# Patient Record
Sex: FEMALE | Race: WHITE | Employment: STUDENT | URBAN - METROPOLITAN AREA
[De-identification: names, ages, dates, MRNs, and addresses within clinical notes are randomized per-mention and may not be internally consistent; named-entity substitution may affect disease eponyms.]

---
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
## 2020-03-11 ENCOUNTER — HEALTH MAINTENANCE LETTER (OUTPATIENT)
Age: 20
End: 2020-03-11

## 2020-12-27 ENCOUNTER — HEALTH MAINTENANCE LETTER (OUTPATIENT)
Age: 20
End: 2020-12-27

## 2021-04-20 ENCOUNTER — HOSPITAL ENCOUNTER (EMERGENCY)
Facility: CLINIC | Age: 21
Discharge: HOME | End: 2021-04-21
Attending: EMERGENCY MEDICINE | Admitting: EMERGENCY MEDICINE
Payer: COMMERCIAL

## 2021-04-20 ENCOUNTER — APPOINTMENT (OUTPATIENT)
Dept: GENERAL RADIOLOGY | Facility: CLINIC | Age: 21
End: 2021-04-20
Attending: EMERGENCY MEDICINE
Payer: COMMERCIAL

## 2021-04-20 VITALS
TEMPERATURE: 98.5 F | SYSTOLIC BLOOD PRESSURE: 123 MMHG | OXYGEN SATURATION: 100 % | HEART RATE: 70 BPM | DIASTOLIC BLOOD PRESSURE: 67 MMHG

## 2021-04-20 DIAGNOSIS — S59.901A ELBOW INJURY, RIGHT, INITIAL ENCOUNTER: ICD-10-CM

## 2021-04-20 PROCEDURE — 250N000013 HC RX MED GY IP 250 OP 250 PS 637: Performed by: EMERGENCY MEDICINE

## 2021-04-20 PROCEDURE — 99284 EMERGENCY DEPT VISIT MOD MDM: CPT | Performed by: EMERGENCY MEDICINE

## 2021-04-20 PROCEDURE — 73070 X-RAY EXAM OF ELBOW: CPT | Mod: RT

## 2021-04-20 PROCEDURE — 99283 EMERGENCY DEPT VISIT LOW MDM: CPT

## 2021-04-20 PROCEDURE — 73070 X-RAY EXAM OF ELBOW: CPT | Mod: 26 | Performed by: RADIOLOGY

## 2021-04-20 RX ORDER — OXYCODONE AND ACETAMINOPHEN 5; 325 MG/1; MG/1
1 TABLET ORAL ONCE
Status: COMPLETED | OUTPATIENT
Start: 2021-04-20 | End: 2021-04-20

## 2021-04-20 RX ORDER — OXYCODONE AND ACETAMINOPHEN 5; 325 MG/1; MG/1
1-2 TABLET ORAL EVERY 4 HOURS PRN
Qty: 12 TABLET | Refills: 0 | Status: SHIPPED | OUTPATIENT
Start: 2021-04-20

## 2021-04-20 RX ADMIN — OXYCODONE HYDROCHLORIDE AND ACETAMINOPHEN 1 TABLET: 5; 325 TABLET ORAL at 23:32

## 2021-04-21 NOTE — ED PROVIDER NOTES
ED Provider Note  [facility]      History     Chief Complaint   Patient presents with     Arm Injury     HPI  Zuleika Noonan is a 20 year old female who presents to the ED for evaluation of a right arm injury following a fall. Patient reports that she fell off of a playground platform appproximately at 5 PM. She notes that since the fall, she has not been able to fully move her arm without experiencing pain.  Patient is right-handed.  She denies any head injury, neck or back injury.  Denies any numbness or weakness in the right hand, but does have increased pain when moving the right hand.  Her pain radiates from the right elbow down into the forearm and up into the bicep area.  She otherwise denies fever, cough, chest pain, shortness of breath, abdominal pain or other injuries.    Past Medical History  History reviewed. No pertinent past medical history.  History reviewed. No pertinent surgical history.  oxyCODONE-acetaminophen (PERCOCET) 5-325 MG tablet  NEW MED  polyethylene glycol (MIRALAX/GLYCOLAX) Packet      No Known Allergies  Family History  History reviewed. No pertinent family history.  Social History   Social History     Tobacco Use     Smoking status: Never Smoker     Smokeless tobacco: Never Used   Substance Use Topics     Alcohol use: Yes     Comment: A BEER A WEEK     Drug use: No      Past medical history, past surgical history, medications, allergies, family history, and social history were reviewed with the patient. No additional pertinent items.       Review of Systems  A complete review of systems was performed with pertinent positives and negatives noted in the HPI, and all other systems negative.    Physical Exam   BP: 123/67  Pulse: 70  Temp: 98.5  F (36.9  C)  SpO2: 100 %  Physical Exam    GEN:  Alert, well developed, no acute distress  HEENT:  PERRL, EOMI, Mucous membranes are moist.   Cardio:  RRR, no murmur, radial pulses equal bilaterally.  Normal distal  capillary refill in the right hand.  PULM:  Lungs clear, good air movement, no wheezes, rales   Abd:  Soft, normal bowel sounds, no focal tenderness  Back exam:  No CVA tenderness, no C-spine, T-spine, L-spine tenderness  Musculoskeletal: No tenderness over the right clavicle or right shoulder, there is tenderness over the right olecranon, no tenderness in the forearm, wrist or right hand.  Patient does not fully extend at the right elbow due to pain.  There is normal range of motion in the hand and wrist.  No ecchymosis or swelling noted over the right upper extremity. no lower extremity swelling or calf tenderness  Neuro:  Alert and oriented X3, Follows commands, moving all extremities spontaneously, normal strength and sensation in the right hand and fingers.  Strength testing at the elbow is limited by pain.  Skin:  Warm, dry   ED Course      Procedures        X-ray of the right elbow was reviewed by me and results are shown below.  Results of the x-ray were also discussed with orthopedics.  Since there is no fracture seen on the x-ray, if there is a fracture present, it is likely nondisplaced, so orthopedics does not think patient needs emergent MRI or further imaging tonight.  Patient was given Percocet for pain.     Results for orders placed or performed during the hospital encounter of 04/20/21   XR Elbow Right 2 Views     Status: None    Narrative    EXAM: XR ELBOW RIGHT 2 VIEWS  LOCATION: [facility]  DATE/TIME: 4/20/2021 8:51 PM    INDICATION: Right elbow pain. Recent fall.  COMPARISON: None.      Impression    IMPRESSION: No fracture or dislocation. Large elbow joint effusion suggesting an occult fracture. Follow-up radiographs in 10-14 days or MRI may be helpful.     Medications   oxyCODONE-acetaminophen (PERCOCET) 5-325 MG per tablet 1 tablet (1 tablet Oral Given 4/20/21 7560)        Assessments & Plan (with Medical Decision Making)   Patient presents with right elbow injury after a  mechanical fall earlier today.  There is an effusion seen on the x-ray indicating possible occult fracture versus perhaps a ligamentous injury.  Orthopedics indicated that if there is a fracture, it is likely nondisplaced and would be treated nonoperatively.  They advised to comfort sling and follow-up with orthopedics in 1 to 2 weeks if the pain is not improved.  This was explained to the patient and she is agreeable with outpatient follow-up.  Patient was advised to take ibuprofen and Tylenol during the day for pain control.  Percocet was prescribed for nighttime use if needed.  Patient was advised to return to the emergency department if she has any numbness or weakness in the right hand or wrist, any color changes to the right hand, especially if there is a blue, gray, ashen color to the fingers of the right hand.  On the exam today, there are no neurovascular deficits, no symptoms to suggest neuro deficit or vascular deficit.    I have reviewed the nursing notes. I have reviewed the findings, diagnosis, plan and need for follow up with the patient.    Discharge Medication List as of 4/21/2021 12:04 AM      START taking these medications    Details   oxyCODONE-acetaminophen (PERCOCET) 5-325 MG tablet Take 1-2 tablets by mouth every 4 hours as needed for pain, Disp-12 tablet, R-0, Local Print             Final diagnoses:   Elbow injury, right, initial encounter       --    [facility] EMERGENCY DEPARTMENT  4/20/2021     Deysi Pacheco MD  04/21/21 0042

## 2021-04-21 NOTE — DISCHARGE INSTRUCTIONS
Use the arm sling for comfort, but do take it out of the sling a few times a day to move the elbow if able to.  Return to the emergency department if you have any numbness or weakness in the right hand or wrist area or if the right hand or fingers are cold, blue, gray color (if they are not the normal pink color).    Please make an appointment to follow up with Orthopedics Clinic (phone: [phone]) in 10-14 days for reexamination, possible reimaging.  The x-rays today show that you do have an injury to the elbow.  It does not show a broken bone, however, sometimes broken bones can be present without showing up on x-ray.  It is also possible that you could have an injury to the ligaments in your elbow.  It is okay to take both ibuprofen and Tylenol together for pain control.  Do not take Tylenol if you are also taking the Percocet for pain.

## 2021-04-21 NOTE — ED TRIAGE NOTES
Pt fell on her R elbow off a playground platform around 1700. She says she cannot move her arm without extreme pain. The pain is located in her elbow radiating up into the tricep area. Denies other injuries such as head or neck pain.

## 2021-04-25 ENCOUNTER — HEALTH MAINTENANCE LETTER (OUTPATIENT)
Age: 21
End: 2021-04-25

## 2021-05-03 NOTE — TELEPHONE ENCOUNTER
DIAGNOSIS: R elbow injury, unable to straighten out, weakness/ DOI: 4/20/2021/ XR in ER   APPOINTMENT DATE: 5.4.21   NOTES STATUS DETAILS   OFFICE NOTE from referring provider N/A    OFFICE NOTE from other specialist N/A    DISCHARGE SUMMARY from hospital N/A    DISCHARGE REPORT from the ER Internal 4.20.21 [facility] ED   OPERATIVE REPORT N/A    EMG report N/A    MEDICATION LIST Internal    MRI N/A    DEXA (osteoporosis/bone health) N/A    CT SCAN N/A    XRAYS (IMAGES & REPORTS) Internal 4.20.21 R elbow

## 2021-05-03 NOTE — PROGRESS NOTES
ASSESSMENT/PLAN:    (S52.124D) Closed nondisplaced fracture of head of right radius with routine healing, subsequent encounter  (primary encounter diagnosis)  Comment: exam/imaging consistent w/ occult radial head fx; will start PT; precautions/ anticipatory guidance given; f/u in 4 wks  Plan: X-ray rt Elbow 2 vw, PHYSICAL THERAPY REFERRAL (Internal)          Elijah Neville MD  May 4, 2021  3:30 PM        Pt is a 20 year old female seen in the ED on 4/20/2021 here today for:     Right Elbow pain :   Location: Right lateral and medial elbow   Duration: DOI: 4/20/202- 2 weeks  Radiation: Yes down into right forearm   Numbness/ Tingling? After not moving elbow in a while  Weakness? Yes and limited extension.   Swelling? Yes  Imaging? Xrays 4/20/21  Treatment tried: Sling, percocet     Xray - 4/20/2021                                                               IMPRESSION: No fracture or dislocation. Large elbow joint effusion suggesting an occult fracture. Follow-up radiographs in 10-14 days or MRI may be helpful    Per ED note:  TARA Noonan is a 20 year old female who presents to the ED for evaluation of a right arm injury following a fall. Patient reports that she fell off of a playground platform appproximately at 5 PM. She notes that since the fall, she has not been able to fully move her arm without experiencing pain.  Patient is right-handed.  She denies any head injury, neck or back injury.  Denies any numbness or weakness in the right hand, but does have increased pain when moving the right hand.  Her pain radiates from the right elbow down into the forearm and up into the bicep area.    A/P:  Patient presents with right elbow injury after a mechanical fall earlier today.  There is an effusion seen on the x-ray indicating possible occult fracture versus perhaps a ligamentous injury.  Orthopedics indicated that if there is a fracture, it is likely nondisplaced and would be treated nonoperatively.   "They advised to comfort sling and follow-up with orthopedics in 1 to 2 weeks if the pain is not improved.  This was explained to the patient and she is agreeable with outpatient follow-up.  Patient was advised to take ibuprofen and Tylenol during the day for pain control.  Percocet was prescribed for nighttime use if needed.  Patient was advised to return to the emergency department if she has any numbness or weakness in the right hand or wrist, any color changes to the right hand, especially if there is a blue, gray, ashen color to the fingers of the right hand.  On the exam today, there are no neurovascular deficits, no symptoms to suggest neuro deficit or vascular deficit.    No past medical history on file.   No past surgical history on file.   Current Outpatient Medications   Medication Sig Dispense Refill     oxyCODONE-acetaminophen (PERCOCET) 5-325 MG tablet Take 1-2 tablets by mouth every 4 hours as needed for pain 12 tablet 0     NEW MED Amitriptyline 2%, Baclofen 2%, Gabapentin 2% compounded in Vanicream.  Apply to affected area twice a day for 4 weeks. (Patient not taking: Reported on 5/4/2021) 1 Tube 0     polyethylene glycol (MIRALAX/GLYCOLAX) Packet Take 17 g by mouth 2 times daily 14 packet 1      No Known Allergies   ROS:   Gen- no fevers/chills   Rheum - no morning stiffness   Derm - no rash/ redness   Neuro - no numbness, no tingling   Remainder of ROS negative.     Exam:   Ht 1.695 m (5' 6.73\")   Wt 74 kg (163 lb 3.2 oz)   BMI 25.77 kg/m         R ELBOW:   Swelling: mild   ROM: Flexion - 90/ extension - lacks terminal 10 degrees; pronation - Full / supination- painful/limited.   Strength: 5/5 w/ elbow flexion/ extension   Bony tenderness: + tenderness at medial epicondyle/ lateral epicondyle/ no TTP at olecranon. +TTP at radial head  Neuro: sensation intact   Maneuvers: deferred      Repeat x-ray elbow - 5/4/2021 - at [facility]    Improved fat pad sign  No fx noted     "

## 2021-05-04 ENCOUNTER — ANCILLARY PROCEDURE (OUTPATIENT)
Dept: GENERAL RADIOLOGY | Facility: CLINIC | Age: 21
End: 2021-05-04
Attending: FAMILY MEDICINE
Payer: COMMERCIAL

## 2021-05-04 ENCOUNTER — OFFICE VISIT (OUTPATIENT)
Dept: ORTHOPEDICS | Facility: CLINIC | Age: 21
End: 2021-05-04
Payer: COMMERCIAL

## 2021-05-04 ENCOUNTER — PRE VISIT (OUTPATIENT)
Dept: ORTHOPEDICS | Facility: CLINIC | Age: 21
End: 2021-05-04

## 2021-05-04 VITALS — HEIGHT: 67 IN | BODY MASS INDEX: 25.62 KG/M2 | WEIGHT: 163.2 LBS

## 2021-05-04 DIAGNOSIS — S52.124D CLOSED NONDISPLACED FRACTURE OF HEAD OF RIGHT RADIUS WITH ROUTINE HEALING, SUBSEQUENT ENCOUNTER: Primary | ICD-10-CM

## 2021-05-04 DIAGNOSIS — S52.124D CLOSED NONDISPLACED FRACTURE OF HEAD OF RIGHT RADIUS WITH ROUTINE HEALING, SUBSEQUENT ENCOUNTER: ICD-10-CM

## 2021-05-04 PROCEDURE — 99203 OFFICE O/P NEW LOW 30 MIN: CPT | Performed by: FAMILY MEDICINE

## 2021-05-04 PROCEDURE — 73070 X-RAY EXAM OF ELBOW: CPT | Mod: RT | Performed by: RADIOLOGY

## 2021-05-04 ASSESSMENT — MIFFLIN-ST. JEOR: SCORE: 1533.64

## 2021-05-04 NOTE — LETTER
Date:May 5, 2021      Patient was self referred, no letter generated. Do not send.        [facility] Health Information

## 2021-05-04 NOTE — LETTER
5/4/2021      RE: Zuleika Noonan  [address]  [facility] [address]       ASSESSMENT/PLAN:    (S52.230N) Closed nondisplaced fracture of head of right radius with routine healing, subsequent encounter  (primary encounter diagnosis)  Comment: exam/imaging consistent w/ occult radial head fx; will start PT; precautions/ anticipatory guidance given; f/u in 4 wks  Plan: X-ray rt Elbow 2 vw, PHYSICAL THERAPY REFERRAL (Internal)          Elijah Neville MD  May 4, 2021  3:30 PM        Pt is a 20 year old female seen in the ED on 4/20/2021 here today for:     Right Elbow pain :   Location: Right lateral and medial elbow   Duration: DOI: 4/20/202- 2 weeks  Radiation: Yes down into right forearm   Numbness/ Tingling? After not moving elbow in a while  Weakness? Yes and limited extension.   Swelling? Yes  Imaging? Xrays 4/20/21  Treatment tried: Sling, percocet     Xray - 4/20/2021                                                               IMPRESSION: No fracture or dislocation. Large elbow joint effusion suggesting an occult fracture. Follow-up radiographs in 10-14 days or MRI may be helpful    Per ED note:  HPI  Zuleika Noonan is a 20 year old female who presents to the ED for evaluation of a right arm injury following a fall. Patient reports that she fell off of a playground platform appproximately at 5 PM. She notes that since the fall, she has not been able to fully move her arm without experiencing pain.  Patient is right-handed.  She denies any head injury, neck or back injury.  Denies any numbness or weakness in the right hand, but does have increased pain when moving the right hand.  Her pain radiates from the right elbow down into the forearm and up into the bicep area.    A/P:  Patient presents with right elbow injury after a mechanical fall earlier today.  There is an effusion seen on the x-ray indicating possible occult fracture versus perhaps a ligamentous injury.  Orthopedics indicated that  "if there is a fracture, it is likely nondisplaced and would be treated nonoperatively.  They advised to comfort sling and follow-up with orthopedics in 1 to 2 weeks if the pain is not improved.  This was explained to the patient and she is agreeable with outpatient follow-up.  Patient was advised to take ibuprofen and Tylenol during the day for pain control.  Percocet was prescribed for nighttime use if needed.  Patient was advised to return to the emergency department if she has any numbness or weakness in the right hand or wrist, any color changes to the right hand, especially if there is a blue, gray, ashen color to the fingers of the right hand.  On the exam today, there are no neurovascular deficits, no symptoms to suggest neuro deficit or vascular deficit.    No past medical history on file.   No past surgical history on file.   Current Outpatient Medications   Medication Sig Dispense Refill     oxyCODONE-acetaminophen (PERCOCET) 5-325 MG tablet Take 1-2 tablets by mouth every 4 hours as needed for pain 12 tablet 0     NEW MED Amitriptyline 2%, Baclofen 2%, Gabapentin 2% compounded in Vanicream.  Apply to affected area twice a day for 4 weeks. (Patient not taking: Reported on 5/4/2021) 1 Tube 0     polyethylene glycol (MIRALAX/GLYCOLAX) Packet Take 17 g by mouth 2 times daily 14 packet 1      No Known Allergies   ROS:   Gen- no fevers/chills   Rheum - no morning stiffness   Derm - no rash/ redness   Neuro - no numbness, no tingling   Remainder of ROS negative.     Exam:   Ht 1.695 m (5' 6.73\")   Wt 74 kg (163 lb 3.2 oz)   BMI 25.77 kg/m         R ELBOW:   Swelling: mild   ROM: Flexion - 90/ extension - lacks terminal 10 degrees; pronation - Full / supination- painful/limited.   Strength: 5/5 w/ elbow flexion/ extension   Bony tenderness: + tenderness at medial epicondyle/ lateral epicondyle/ no TTP at olecranon. +TTP at radial head  Neuro: sensation intact   Maneuvers: deferred      Repeat x-ray elbow - " 5/4/2021 - at [facility] [place]    Improved fat pad sign  No fx noted         Elijah Neville MD

## 2021-05-14 ENCOUNTER — TELEPHONE (OUTPATIENT)
Dept: ORTHOPEDICS | Facility: CLINIC | Age: 21
End: 2021-05-14

## 2021-05-14 DIAGNOSIS — S52.124D CLOSED NONDISPLACED FRACTURE OF HEAD OF RIGHT RADIUS WITH ROUTINE HEALING, SUBSEQUENT ENCOUNTER: Primary | ICD-10-CM

## 2021-05-14 NOTE — TELEPHONE ENCOUNTER
"Per Breanne in PT:    \"Hi Dr. Neville!     Would you mind if we sent Jaye to hand therapy instead of PT?  The elbow is the gray zone, but radial head fractures (I believe in our building) will be better treated by our hand therapy team!  Thanks so much.     Breanne\"   "

## 2021-05-17 ENCOUNTER — THERAPY VISIT (OUTPATIENT)
Dept: OCCUPATIONAL THERAPY | Facility: CLINIC | Age: 21
End: 2021-05-17
Attending: FAMILY MEDICINE
Payer: COMMERCIAL

## 2021-05-17 DIAGNOSIS — S52.124D CLOSED NONDISPLACED FRACTURE OF HEAD OF RIGHT RADIUS WITH ROUTINE HEALING, SUBSEQUENT ENCOUNTER: ICD-10-CM

## 2021-05-17 PROCEDURE — 97535 SELF CARE MNGMENT TRAINING: CPT | Mod: GO | Performed by: OCCUPATIONAL THERAPIST

## 2021-05-17 PROCEDURE — 97110 THERAPEUTIC EXERCISES: CPT | Mod: GO | Performed by: OCCUPATIONAL THERAPIST

## 2021-05-17 PROCEDURE — 97112 NEUROMUSCULAR REEDUCATION: CPT | Mod: GO | Performed by: OCCUPATIONAL THERAPIST

## 2021-05-17 PROCEDURE — 97165 OT EVAL LOW COMPLEX 30 MIN: CPT | Mod: GO | Performed by: OCCUPATIONAL THERAPIST

## 2021-05-17 NOTE — PROGRESS NOTES
Hand Therapy Initial Evaluation    Current Date:  5/17/2021    Diagnosis: occult radial head fx r   DOI: fall on 4/20/21     Subjective:  Zuleika Noonan is a 20 year old female.    Patient reports symptoms of the right elbow which occurred due to a fall. Fell off a 6ft high structure fell on the posterior aspect of the FA in supination, bending the elbow into hyperflexion. Pt reports hearing a pop. Since onset symptoms are Gradually getting better.  General health as reported by patient is good.      Pertinent medical history includes: No past medical history on file.    Medical allergies:  No Known Allergies    Surgical history: No past surgical history on file.    Medication history:   Current Outpatient Medications   Medication     NEW MED     oxyCODONE-acetaminophen (PERCOCET) 5-325 MG tablet     No current facility-administered medications for this visit.      Current occupation is student   Job Tasks: Computer Work, Repetitive Tasks    Occupational Profile Information:  Right hand dominant  Prior functional level:  no limitations  Patient reports symptoms of pain, stiffness/loss of motion, weakness/loss of strength, edema and tingling   Special tests:  x-ray.    Previous treatment: none  Barriers include:live alone  Mobility: No difficulty  Transportation: public transportation and walking   Currently working in normal job without restrictions  Leisure activities/hobbies: writing tasks, drawing     Functional Outcome Measure:     Upper Extremity Functional Index Score:  SCORE:   Column Totals: /80: 60   (A lower score indicates greater disability.)    Objective:  Pain Level (Scale 0-10)   5/17/2021   At Rest 0-1   With Use 4     Pain Description  Date 5/17/2021   Location Lateral elbow, radial FA, ulnar wrist    Pain Quality Sharp and buzzing    Frequency intermittent or constant     Pain is worst  daytime   Exacerbated by  reaching    Relieved by stretch   Progression Slowly getting better with set backs       Edema  Mild    Edema (Circumference measured in cm)   5/17/2021 5/17/2021    L R   At Elbow crease in elbow ext  25.3 26     Sensation   Decreased Ulnar and radial Nerve distribution per pt report at the level of the hand     Special Tests   - none    + mild    ++ moderate    +++ severe       1/22/2021   Tinels Cubital Tunnel ++    Tinels Guyons Canal -   Radial nerve compression at PIN site -   Tinels DRSN -     Clinical Examination of the Elbow  ACTIVE/PASSIVE TESTING 5/17/2021   A. Elbow Flexion -   P.  Elbow Flexion          -Full pronation +         -Full supination ++   A. Elbow Extension -   P.  Elbow Extension          -Full pronation ++         -Full supination ++   A.  Forearm Pronation -   P.  Forearm Pronation -   A.  Forearm Supination -   P.  Forearm Supination ++   P.  Wrist Flexion -   P.  Wrist Extension -   RESISTED TESTING    R.  Wrist Elbow Flexion          -Forearm supinated ++         -Forearm neutral +         -Forearm pronated +   R.  Wrist Elbow Extension +   R.  Forearm Pronation +   R.  Forearm Supination +   R.  Wrist Extension with RD ++ (dorsal hand)   R.  Wrist Extension with UD -   R.  MCPJ Extension (2-5) nt   R.  Wrist Flexion with RD  -   R.  Wrist Flexion with UD -   EXTRA TESTS:  Lateral sided pain    Pull test-Fatoumata (HRJ chondropathy) nt   Humeroradial joint position test(Malalignment) nt   Lateral pivot-shift test (PLRI) nt   Push up sign (PLRI) +    test (lateral tendinopathy) -   EXTRA TESTS:  Medial sided pain    Valugus stress test (insufficiency of ant. Band of UCL) +++   Moving valugus stress test (UCL insuffiency)  nt     ROM  Pain Report: - none  + mild    ++ moderate    +++ severe   Elbow 5/17/2021 5/17/2021   AROM (PROM) L R   Extension wnl -15 lacking   Flexion wnl wnl   Supination wnl wnl   Pronation wnl wnl     Strength  Strength   (Measured in pounds)  Pain Report: - none  + mild    ++ moderate    +++ severe    5/17/2021 5/17/2021   Trials L R    1  2  3 55 61   Average 55 61     Lat Pinch 5/17/2021 5/17/2021   Trials L R   1  2  3 16 10   Average 16 10     3 Pt Pinch 5/17/2021 5/17/2021   Trials L R   1  2  3 8 6   Average 8 6     Assessment:  Patient presents with symptoms consistent with diagnosis of right elbow  Radial head fx,  with conservative intervention.     Patient's limitations or Problem List includes:  Pain, Decreased ROM/motion, Increased edema, Weakness, Sensory disturbance, Decreased stability, Hypomobility and Tightness in musculature of the right elbow which interferes with the patient's ability to perform Self Care Tasks (dressing, eating, bathing, hygiene/toileting), Work Tasks, Sleep Patterns, Recreational Activities, Household Chores and Driving  as compared to previous level of function.    Rehab Potential:  Excellent - Return to full activity, no limitations    Patient will benefit from skilled Occupational Therapy to increase ROM, overall strength and sensation and decrease pain and edema to return to previous activity level and resume normal daily tasks and to reach their rehab potential.    Barriers to Learning:  No barrier    Communication Issues:  Patient appears to be able to clearly communicate and understand verbal and written communication and follow directions correctly.    Chart Review: Chart Review and Brief history including review of medical and/or therapy records relating to the presenting problem    Identified Performance Deficits: bathing/showering, feeding, functional mobility, hygiene and grooming, communication management, driving and community mobility, health management and maintenance, meal preparation and cleanup, work and leisure activities    Assessment of Occupational Performance:  5 or more Performance Deficits    Clinical Decision Making (Complexity): Low complexity    Treatment Explanation:  The following has been discussed with the patient:  RX ordered/plan of care  Anticipated outcomes  Possible  risks and side effects    Plan:  Frequency:  1 X week, once daily  Duration:  for 9 weeks    Treatment Plan:   Modalities:  US and Paraffin  Therapeutic Exercise:  AROM, AAROM, PROM, Tendon Gliding, Blocking, Reverse Blocking, Place and Hold, Contract Relax, Extensor Tracking, Isotonics, Isometrics and Stabilization  Neuromuscular re-education:  Nerve Gliding, Coordination/Dexterity, Kinesthetic Training, Proprioceptive Training, Posture, Kinesiotaping and Strain Counter Strain  Manual Techniques:  Joint mobilization, Friction massage, Myofascial release and Manual edema mobilization  Orthotic Fabrication:  Static orthosis, Hand based orthosis and Forearm based orthosis  Self Care:  Self Care Tasks, Ergonomic Considerations and Community Transportation    Discharge Plan:  Achieve all LTG.  Independent in home treatment program.  Reach maximal therapeutic benefit.    Home Exercise Program:  Elbow Active Range of Motion Combined Extension and Flexion   EMR Notes   HEP -  Sets 1-2   Reps 10   Sessions per day 2-3   Notes   Chair Push Ups   EMR Notes   HEP -  Sets 1   Reps 10   Sessions per day 1   Notes   Wrist Stabilization DTM/Dart Thrower's Motion   EMR Notes   HEP -  Sets 1   Reps 10   Sessions per day 1   Notes   Median Nerve Mobility   EMR Notes   HEP -  Sets 1   Reps 10   Sessions per day 1   Notes     Next Visit:  Door frame pullbacks? Wall pushups?

## 2021-05-28 ENCOUNTER — THERAPY VISIT (OUTPATIENT)
Dept: OCCUPATIONAL THERAPY | Facility: CLINIC | Age: 21
End: 2021-05-28
Payer: COMMERCIAL

## 2021-05-28 DIAGNOSIS — S52.124D CLOSED NONDISPLACED FRACTURE OF HEAD OF RIGHT RADIUS WITH ROUTINE HEALING, SUBSEQUENT ENCOUNTER: ICD-10-CM

## 2021-05-28 PROCEDURE — 97112 NEUROMUSCULAR REEDUCATION: CPT | Mod: GO | Performed by: OCCUPATIONAL THERAPIST

## 2021-05-28 PROCEDURE — 97110 THERAPEUTIC EXERCISES: CPT | Mod: GO | Performed by: OCCUPATIONAL THERAPIST

## 2021-05-28 NOTE — PROGRESS NOTES
"ASSESSMENT/PLAN:    (J43.693G) Closed nondisplaced fracture of head of right radius with routine healing, subsequent encounter  (primary encounter diagnosis)  Comment: improving; cont rehab/ home program; f/u prn  Plan: diclofenac (VOLTAREN) 1 % topical gel            Elijah Neville MD  June 1, 2021  12:48 PM    Pt is a 20 year old female last seen on 5/4/2021 here for follow up of:     R elbow -   Patient reports that she is able to fully extend her right elbow, but she reports continued weakness.   Feels pain w/ full extension  PT is going well       Per my last note:  (P71.167M) Closed nondisplaced fracture of head of right radius with routine healing, subsequent encounter  (primary encounter diagnosis)  Comment: exam/imaging consistent w/ occult radial head fx; will start PT; precautions/ anticipatory guidance given; f/u in 4 wks  Plan: X-ray rt Elbow 2 vw, PHYSICAL THERAPY REFERRAL (Internal)    No past medical history on file.   Current Outpatient Medications   Medication Sig Dispense Refill     NEW MED Amitriptyline 2%, Baclofen 2%, Gabapentin 2% compounded in Vanicream.  Apply to affected area twice a day for 4 weeks. (Patient not taking: Reported on 5/4/2021) 1 Tube 0     oxyCODONE-acetaminophen (PERCOCET) 5-325 MG tablet Take 1-2 tablets by mouth every 4 hours as needed for pain 12 tablet 0      No Known Allergies   ROS:   Gen- no fevers/chills   Rheum - no morning stiffness   Derm - no rash/ redness   Neuro -few times when arm feels numb when asleep -> pins and needles that resolve w/ shaking   Remainder of ROS negative.     Exam:   Ht 1.695 m (5' 6.73\")   Wt 73.9 kg (163 lb)   BMI 25.74 kg/m      R elbow: Full ROM  Strength 4+/5 w/ wrist extension and supination but otherwise 5/5  Pain w/ resisted wrist extension over lateral elbow           "

## 2021-05-28 NOTE — PROGRESS NOTES
SOAP note objective information for 5/28/2021.    Objective:  Pain Level (Scale 0-10)   5/17/2021   At Rest 0-1   With Use 4     Pain Description  Date 5/17/2021   Location Lateral elbow, radial FA, ulnar wrist    Pain Quality Sharp and buzzing    Frequency intermittent or constant     Pain is worst  daytime   Exacerbated by  reaching    Relieved by stretch   Progression Slowly getting better with set backs      Edema  Mild    Edema (Circumference measured in cm)   5/17/2021 5/17/2021    L R   At Elbow crease in elbow ext  25.3 26     Sensation   Decreased Ulnar and radial Nerve distribution per pt report at the level of the hand     Special Tests   - none    + mild    ++ moderate    +++ severe       1/22/2021   Tinels Cubital Tunnel ++    Tinels Guyons Canal -   Radial nerve compression at PIN site -   [place] DRSN -     Clinical Examination of the Elbow  ACTIVE/PASSIVE TESTING 5/17/2021   A. Elbow Flexion -   P.  Elbow Flexion          -Full pronation +         -Full supination ++   A. Elbow Extension -   P.  Elbow Extension          -Full pronation ++         -Full supination ++   A.  Forearm Pronation -   P.  Forearm Pronation -   A.  Forearm Supination -   P.  Forearm Supination ++   P.  Wrist Flexion -   P.  Wrist Extension -   RESISTED TESTING    R.  Wrist Elbow Flexion          -Forearm supinated ++         -Forearm neutral +         -Forearm pronated +   R.  Wrist Elbow Extension +   R.  Forearm Pronation +   R.  Forearm Supination +   R.  Wrist Extension with RD ++ (dorsal hand)   R.  Wrist Extension with UD -   R.  MCPJ Extension (2-5) nt   R.  Wrist Flexion with RD  -   R.  Wrist Flexion with UD -   EXTRA TESTS:  Lateral sided pain    Pull test-Fatoumata (HRJ chondropathy) nt   Humeroradial joint position test(Malalignment) nt   Lateral pivot-shift test (PLRI) nt   Push up sign (PLRI) +    test (lateral tendinopathy) -   EXTRA TESTS:  Medial sided pain    Valugus stress test (insufficiency of ant. Band of  [facility]) +++   Moving valugus stress test ([facility] insuffiency)  nt     ROM  Pain Report: - none  + mild    ++ moderate    +++ severe   Elbow 5/17/2021 5/17/2021 6/4/21   AROM (PROM) L R R   Extension wnl 15 10   Flexion wnl wnl    Supination wnl wnl    Pronation wnl wnl      Strength   (Measured in pounds)  Pain Report: - none  + mild    ++ moderate    +++ severe    5/17/2021 5/17/2021   Trials L R   1  2  3 55 61   Average 55 61     Lat Pinch 5/17/2021 5/17/2021   Trials L R   1  2  3 16 10   Average 16 10     3 Pt Pinch 5/17/2021 5/17/2021   Trials L R   1  2  3 8 6   Average 8 6

## 2021-06-01 ENCOUNTER — OFFICE VISIT (OUTPATIENT)
Dept: ORTHOPEDICS | Facility: CLINIC | Age: 21
End: 2021-06-01
Payer: COMMERCIAL

## 2021-06-01 VITALS — BODY MASS INDEX: 25.58 KG/M2 | WEIGHT: 163 LBS | HEIGHT: 67 IN

## 2021-06-01 DIAGNOSIS — S52.124D CLOSED NONDISPLACED FRACTURE OF HEAD OF RIGHT RADIUS WITH ROUTINE HEALING, SUBSEQUENT ENCOUNTER: Primary | ICD-10-CM

## 2021-06-01 PROCEDURE — 99213 OFFICE O/P EST LOW 20 MIN: CPT | Performed by: FAMILY MEDICINE

## 2021-06-01 ASSESSMENT — MIFFLIN-ST. JEOR: SCORE: 1532.7

## 2021-06-01 NOTE — LETTER
"  6/1/2021      RE: Zuleika Nooann  [address]  [facility] [address]       ASSESSMENT/PLAN:    (C12.221R) Closed nondisplaced fracture of head of right radius with routine healing, subsequent encounter  (primary encounter diagnosis)  Comment: improving; cont rehab/ home program; f/u prn  Plan: diclofenac (VOLTAREN) 1 % topical gel            Elijah Neville MD  June 1, 2021  12:48 PM    Pt is a 20 year old female last seen on 5/4/2021 here for follow up of:     R elbow -   Patient reports that she is able to fully extend her right elbow, but she reports continued weakness.   Feels pain w/ full extension  PT is going well       Per my last note:  (Q02.715A) Closed nondisplaced fracture of head of right radius with routine healing, subsequent encounter  (primary encounter diagnosis)  Comment: exam/imaging consistent w/ occult radial head fx; will start PT; precautions/ anticipatory guidance given; f/u in 4 wks  Plan: X-ray rt Elbow 2 vw, PHYSICAL THERAPY REFERRAL (Internal)    No past medical history on file.   Current Outpatient Medications   Medication Sig Dispense Refill     NEW MED Amitriptyline 2%, Baclofen 2%, Gabapentin 2% compounded in Vanicream.  Apply to affected area twice a day for 4 weeks. (Patient not taking: Reported on 5/4/2021) 1 Tube 0     oxyCODONE-acetaminophen (PERCOCET) 5-325 MG tablet Take 1-2 tablets by mouth every 4 hours as needed for pain 12 tablet 0      No Known Allergies   ROS:   Gen- no fevers/chills   Rheum - no morning stiffness   Derm - no rash/ redness   Neuro -few times when arm feels numb when asleep -> pins and needles that resolve w/ shaking   Remainder of ROS negative.     Exam:   Ht 1.695 m (5' 6.73\")   Wt 73.9 kg (163 lb)   BMI 25.74 kg/m      R elbow: Full ROM  Strength 4+/5 w/ wrist extension and supination but otherwise 5/5  Pain w/ resisted wrist extension over lateral elbow               Elijah Neville MD    "

## 2021-06-01 NOTE — LETTER
Date:June 2, 2021      Patient was self referred, no letter generated. Do not send.        [facility] Health Information

## 2021-06-02 ENCOUNTER — TELEPHONE (OUTPATIENT)
Dept: ORTHOPEDICS | Facility: CLINIC | Age: 21
End: 2021-06-02

## 2021-06-03 NOTE — TELEPHONE ENCOUNTER
Central Prior Authorization Team   Phone: [phone]      PA Initiation    Medication: diclofenac (VOLTAREN) 1 % topical gel-PA initiated  Insurance Company: [employer] - Phone [phone] Fax [phone]  Pharmacy Filling the Rx: [id number] - [place], [place] - [address]   Filling Pharmacy Phone: [phone]  Filling Pharmacy Fax:    Start Date: 6/3/2021

## 2021-06-04 NOTE — TELEPHONE ENCOUNTER
PRIOR AUTHORIZATION DENIED    Medication: diclofenac (VOLTAREN) 1 % topical gel-PA denied    Denial Date: 6/4/2021    Denial Rational: Must try/fail 2 NSAIDs        Appeal Information:

## 2021-06-14 ENCOUNTER — THERAPY VISIT (OUTPATIENT)
Dept: OCCUPATIONAL THERAPY | Facility: CLINIC | Age: 21
End: 2021-06-14
Payer: COMMERCIAL

## 2021-06-14 DIAGNOSIS — S52.124D CLOSED NONDISPLACED FRACTURE OF HEAD OF RIGHT RADIUS WITH ROUTINE HEALING, SUBSEQUENT ENCOUNTER: ICD-10-CM

## 2021-06-14 PROCEDURE — 97110 THERAPEUTIC EXERCISES: CPT | Mod: GO | Performed by: OCCUPATIONAL THERAPIST

## 2021-06-14 PROCEDURE — 97112 NEUROMUSCULAR REEDUCATION: CPT | Mod: GO | Performed by: OCCUPATIONAL THERAPIST

## 2021-06-14 PROCEDURE — 97140 MANUAL THERAPY 1/> REGIONS: CPT | Mod: GO | Performed by: OCCUPATIONAL THERAPIST

## 2021-06-14 NOTE — TELEPHONE ENCOUNTER
Voice mail not set up.  Unable to inform Pt of denial and Dr. Neville's recommendation to use OTC.     - Ramon Martin ATC

## 2021-06-14 NOTE — PROGRESS NOTES
SOAP note objective information for 6/14/2021.    Objective:  Pain Level (Scale 0-10)   5/17/2021   At Rest 0-1   With Use 4     Pain Description  Date 5/17/2021   Location Lateral elbow, radial FA, ulnar wrist    Pain Quality Sharp and buzzing    Frequency intermittent or constant     Pain is worst  daytime   Exacerbated by  reaching    Relieved by stretch   Progression Slowly getting better with set backs      Edema  Mild    Edema (Circumference measured in cm)   5/17/2021 5/17/2021    L R   At Elbow crease in elbow ext  25.3 26     Sensation   Decreased Ulnar and radial Nerve distribution per pt report at the level of the hand     Special Tests   - none    + mild    ++ moderate    +++ severe       1/22/2021   Tinels Cubital Tunnel ++    Tinels Guyons Canal -   Radial nerve compression at PIN site -   [place] DRSN -     Clinical Examination of the Elbow  ACTIVE/PASSIVE TESTING 5/17/2021   A. Elbow Flexion -   P.  Elbow Flexion          -Full pronation +         -Full supination ++   A. Elbow Extension -   P.  Elbow Extension          -Full pronation ++         -Full supination ++   A.  Forearm Pronation -   P.  Forearm Pronation -   A.  Forearm Supination -   P.  Forearm Supination ++   P.  Wrist Flexion -   P.  Wrist Extension -   RESISTED TESTING    R.  Wrist Elbow Flexion          -Forearm supinated ++         -Forearm neutral +         -Forearm pronated +   R.  Wrist Elbow Extension +   R.  Forearm Pronation +   R.  Forearm Supination +   R.  Wrist Extension with RD ++ (dorsal hand)   R.  Wrist Extension with UD -   R.  MCPJ Extension (2-5) nt   R.  Wrist Flexion with RD  -   R.  Wrist Flexion with UD -   EXTRA TESTS:  Lateral sided pain    Pull test-Fatoumata (HRJ chondropathy) nt   Humeroradial joint position test(Malalignment) nt   Lateral pivot-shift test (PLRI) nt   Push up sign (PLRI) +    test (lateral tendinopathy) -   EXTRA TESTS:  Medial sided pain    Valugus stress test (insufficiency of ant. Band of  [facility]) +++   Moving valugus stress test ([facility] insuffiency)  nt     ROM  Pain Report: - none  + mild    ++ moderate    +++ severe   Elbow 5/17/2021 5/17/2021 6/4/21 6/14/21   AROM (PROM) L R R R   Extension wnl 15 10 5   Flexion wnl wnl     Supination wnl wnl     Pronation wnl wnl       Strength   (Measured in pounds)  Pain Report: - none  + mild    ++ moderate    +++ severe    5/17/2021 5/17/2021   Trials L R   1  2  3 55 61   Average 55 61     Lat Pinch 5/17/2021 5/17/2021   Trials L R   1  2  3 16 10   Average 16 10     3 Pt Pinch 5/17/2021 5/17/2021   Trials L R   1  2  3 8 6   Average 8 6

## 2021-06-28 ENCOUNTER — THERAPY VISIT (OUTPATIENT)
Dept: OCCUPATIONAL THERAPY | Facility: CLINIC | Age: 21
End: 2021-06-28
Payer: COMMERCIAL

## 2021-06-28 DIAGNOSIS — S52.124D CLOSED NONDISPLACED FRACTURE OF HEAD OF RIGHT RADIUS WITH ROUTINE HEALING, SUBSEQUENT ENCOUNTER: ICD-10-CM

## 2021-06-28 PROCEDURE — 97112 NEUROMUSCULAR REEDUCATION: CPT | Mod: GO | Performed by: OCCUPATIONAL THERAPIST

## 2021-06-28 PROCEDURE — 97110 THERAPEUTIC EXERCISES: CPT | Mod: GO | Performed by: OCCUPATIONAL THERAPIST

## 2021-06-28 PROCEDURE — 97140 MANUAL THERAPY 1/> REGIONS: CPT | Mod: GO | Performed by: OCCUPATIONAL THERAPIST

## 2021-07-12 ENCOUNTER — THERAPY VISIT (OUTPATIENT)
Dept: OCCUPATIONAL THERAPY | Facility: CLINIC | Age: 21
End: 2021-07-12
Payer: COMMERCIAL

## 2021-07-12 DIAGNOSIS — S52.124D CLOSED NONDISPLACED FRACTURE OF HEAD OF RIGHT RADIUS WITH ROUTINE HEALING, SUBSEQUENT ENCOUNTER: ICD-10-CM

## 2021-07-12 PROCEDURE — 97140 MANUAL THERAPY 1/> REGIONS: CPT | Mod: GO | Performed by: OCCUPATIONAL THERAPIST

## 2021-07-12 PROCEDURE — 97110 THERAPEUTIC EXERCISES: CPT | Mod: GO | Performed by: OCCUPATIONAL THERAPIST

## 2021-07-12 NOTE — PROGRESS NOTES
Hand Therapy Progress Note    Current Date:  7/12/2021    Diagnosis: occult radial head fx r   DOI: fall on 4/20/21     Reporting period is 5/17/21 to 7/12/2021    Subjective:   Subjective changes noted by patient: Things are a lot better this week, I can go for longer doing activities without feeling twinges, don't wake up numb quite so often anymore,   Functional changes noted by patient:  Improvement in Work Tasks, Sleep Patterns, Recreational Activities and Household Chores  Patient has noted adverse reaction to:  None    Objective:    Please refer to the daily flowsheet for treatment provided today.     Objective:  Pain Level (Scale 0-10)   5/17/2021 7/12/21   At Rest 0-1 0   With Use 4 3     Pain Description  Date 5/17/2021 7/12/21   Location Lateral elbow, radial FA, ulnar wrist  Lateral elbow, hypothenar    Pain Quality Sharp and buzzing  Aching, sore   Frequency intermittent or constant      Pain is worst  daytime End of the day    Exacerbated by  reaching  Drawing and writing    Relieved by stretch AROM, stretches to flexors, massage, movement    Progression Slowly getting better with set backs  Getting better      Edema  Mild    Edema (Circumference measured in cm)   5/17/2021 5/17/2021 7/12/21    L R R   At Elbow crease in elbow ext  25.3 26 25.5     Sensation   Decreased Ulnar and radial Nerve distribution per pt report at the level of the hand     Special Tests   - none    + mild    ++ moderate    +++ severe       5/17/21 7/12/21   Tinels Cubital Tunnel ++     Tinels Guyons Canal -    Radial nerve compression at PIN site - ++   Tinels DRSN - +     Clinical Examination of the Elbow  ACTIVE/PASSIVE TESTING 5/17/2021 7/12/21   A. Elbow Flexion - -   P.  Elbow Flexion           -Full pronation + -         -Full supination ++ -   A. Elbow Extension - -   P.  Elbow Extension           -Full pronation ++ -         -Full supination ++ -   A.  Forearm Pronation - -   P.  Forearm Pronation - -   A.  Forearm  Supination - -   P.  Forearm Supination ++ -   P.  Wrist Flexion - -   P.  Wrist Extension - -   RESISTED TESTING     R.  Wrist Elbow Flexion           -Forearm supinated ++ -         -Forearm neutral + -         -Forearm pronated + -   R.  Wrist Elbow Extension + -   R.  Forearm Pronation + -   R.  Forearm Supination + -   R.  Wrist Extension with RD ++ (dorsal hand) ++ (at LEP and dorsal hand)    R.  Wrist Extension with UD - -   R.  MCPJ Extension (2-5) nt nt   R.  Wrist Flexion with RD  - -   R.  Wrist Flexion with UD - -   EXTRA TESTS:  Lateral sided pain     Pull test-Fatoumata (HRJ chondropathy) nt    Humeroradial joint position test(Malalignment) nt    Lateral pivot-shift test (PLRI) nt    Push up sign (PLRI) +     test (lateral tendinopathy) - -   EXTRA TESTS:  Medial sided pain     Valugus stress test (insufficiency of ant. Band of UCL) +++ -   Moving valugus stress test (UCL insuffiency)  nt      ROM  Pain Report: - none  + mild    ++ moderate    +++ severe   Elbow 5/17/2021 5/17/2021 6/4/21 6/14/21   AROM (PROM) L R R R   Extension wnl 15 10 5   Flexion wnl wnl     Supination wnl wnl     Pronation wnl wnl         Strength   (Measured in pounds)  Pain Report: - none  + mild    ++ moderate    +++ severe    5/17/2021 5/17/2021 7/12/21   Trials L R R   1  2  3 55 61 65   Average 55 61 65     Lat Pinch 5/17/2021 5/17/2021   Trials L R   1  2  3 16 10   Average 16 10     3 Pt Pinch 5/17/2021 5/17/2021   Trials L R   1  2  3 8 6   Average 8 6     , elbow extended 5/17/2021   Trials R   1  2  3 65   Average 65      Assessment:  Response to therapy has been improvement to:  Pain:  frequency is less, intensity of pain is decreased, duration of pain is decreased and less tender over affected area    Overall Assessment:  Patient's symptoms are resolving.  Patient is ready to progress to more complex exercises.  STG/LTG:  STGoals have been reviewed and progress or achievement has occurred;  see goal sheet for  details and updates.  LTGoals have been reviewed and progress or achievement has occurred:  see goal sheet for details and updates.    Plan:  Frequency/Duration:  Recommend continuing with the current treatment plan. 1 X week, once daily  for 9 weeks  Appropriateness of Rx I have re-evaluated this patient and find that the nature, scope, duration and intensity of the therapy is appropriate for the medical condition of the patient.  Recommendations for Continued Therapy    Treatment Plan:   Modalities:  US and Paraffin  Therapeutic Exercise:  AROM, AAROM, PROM, Tendon Gliding, Blocking, Reverse Blocking, Place and Hold, Contract Relax, Extensor Tracking, Isotonics, Isometrics and Stabilization  Neuromuscular re-education:  Nerve Gliding, Coordination/Dexterity, Kinesthetic Training, Proprioceptive Training, Posture, Kinesiotaping and Strain Counter Strain  Manual Techniques:  Joint mobilization, Friction massage, Myofascial release and Manual edema mobilization  Orthotic Fabrication:  Static orthosis, Hand based orthosis and Forearm based orthosis  Self Care:  Self Care Tasks, Ergonomic Considerations and Community Transportation     Discharge Plan:  Achieve all LTG.  Independent in home treatment program.  Reach maximal therapeutic benefit.    Home Exercise Program:  Nerve Gliding Proximal Radial  EMR Notes  HEP - Sets 1  Reps 10  Sessions per day 1  Notes  Forearm Passive Range of Motion Advanced Extensor Stretch  EMR Notes  HEP - Sets 3  Reps 15 sec hold  Sessions per day 1-2  Notes pain free, arm relaxed  Wrist Isotonic Strengthening Radial Deviation  EMR Notes  HEP - Sets 1-3  Reps 10  Sessions per day 1  Notes straight wrist  Wrist Strengthening Isotonic Flexion  EMR Notes  HEP - Sets 1-3  Reps 10  Sessions per day 1  Notes come down SLOWLY    Next Visit:  Eccentric wrist strenthening   FA rotation   US of LEP   MFR of ECRB

## 2021-07-19 ENCOUNTER — THERAPY VISIT (OUTPATIENT)
Dept: OCCUPATIONAL THERAPY | Facility: CLINIC | Age: 21
End: 2021-07-19
Payer: COMMERCIAL

## 2021-07-19 DIAGNOSIS — M25.521 RIGHT ELBOW PAIN: Primary | ICD-10-CM

## 2021-07-19 DIAGNOSIS — S52.124D CLOSED NONDISPLACED FRACTURE OF HEAD OF RIGHT RADIUS WITH ROUTINE HEALING, SUBSEQUENT ENCOUNTER: ICD-10-CM

## 2021-07-19 PROCEDURE — 97140 MANUAL THERAPY 1/> REGIONS: CPT | Mod: GO | Performed by: OCCUPATIONAL THERAPIST

## 2021-07-19 PROCEDURE — 97535 SELF CARE MNGMENT TRAINING: CPT | Mod: GO | Performed by: OCCUPATIONAL THERAPIST

## 2021-07-19 PROCEDURE — 97035 APP MDLTY 1+ULTRASOUND EA 15: CPT | Mod: GO | Performed by: OCCUPATIONAL THERAPIST

## 2021-07-19 NOTE — PROGRESS NOTES
SOAP note information for 7/19/2021.  Please refer to the daily flowsheet for treatment today, total treatment time and time spent performing 1:1 timed codes.       DOI: fall on 4/20/21     Subjective:   7/19/2021 Things are doing better, waking up with numbness about once a week. Went bowling. Friend squeezed the arm a bunch of times, may have bothered the arm. Today its sore, may be muscle soreness.   Has a regular small mouse, or uses the laptop mouse. Talks about stretching the shoulder forward a lot when recovering from the fracture.    Objective:  Pain Level (Scale 0-10)   5/17/2021 7/12/21   At Rest 0-1 0   With Use 4 3     Pain Description  Date 5/17/2021 7/12/21   Location Lateral elbow, radial FA, ulnar wrist  Lateral elbow, hypothenar  area   Pain Quality Sharp and buzzing  Aching, sore   Frequency intermittent or constant      Pain is worst  daytime End of the day    Exacerbated by  reaching  Drawing and writing    Relieved by stretch AROM, stretches to flexors, massage, movement    Progression Slowly getting better with set backs  Getting better      Edema  Mild    Edema (Circumference measured in cm)   5/17/2021 5/17/2021 7/12/21    L R R   At Elbow crease in elbow ext  25.3 26 25.5     Sensation   Decreased Ulnar and radial Nerve distribution per pt report at the level of the hand     Special Tests   - none    + mild    ++ moderate    +++ severe       5/17/21 7/12/21 7/19/2021  R   Tinels Cubital Tunnel ++      Tinels Guyons Canal -     Radial nerve compression at PIN site - ++    Tinels DRSN - +    Pressure to guyons canal   +     Clinical Examination of the Elbow  ACTIVE/PASSIVE TESTING 5/17/2021 7/12/21   A. Elbow Flexion - -   P.  Elbow Flexion           -Full pronation + -         -Full supination ++ -   A. Elbow Extension - -   P.  Elbow Extension           -Full pronation ++ -         -Full supination ++ -   A.  Forearm Pronation - -   P.  Forearm Pronation - -   A.  Forearm Supination - -   P.   Forearm Supination ++ -   P.  Wrist Flexion - -   P.  Wrist Extension - -   RESISTED TESTING     R.  Wrist Elbow Flexion           -Forearm supinated ++ -         -Forearm neutral + -         -Forearm pronated + -   R.  Wrist Elbow Extension + -   R.  Forearm Pronation + -   R.  Forearm Supination + -   R.  Wrist Extension with RD ++ (dorsal hand) ++ (at LEP and dorsal hand)    R.  Wrist Extension with UD - -   R.  MCPJ Extension (2-5) nt nt   R.  Wrist Flexion with RD  - -   R.  Wrist Flexion with UD - -   EXTRA TESTS:  Lateral sided pain     Pull test-Fatoumata (HRJ chondropathy) nt    Humeroradial joint position test(Malalignment) nt    Lateral pivot-shift test (PLRI) nt    Push up sign (PLRI) +     test (lateral tendinopathy) - -   EXTRA TESTS:  Medial sided pain     Valugus stress test (insufficiency of ant. Band of UCL) +++ -   Moving valugus stress test (UCL insuffiency)  nt      ROM  Pain Report: - none  + mild    ++ moderate    +++ severe   Elbow 5/17/2021 5/17/2021 6/4/21 6/14/21   AROM (PROM) L R R R   Extension wnl 15 10 5   Flexion wnl wnl     Supination wnl wnl     Pronation wnl wnl         Strength   (Measured in pounds)  Pain Report: - none  + mild    ++ moderate    +++ severe    5/17/2021 5/17/2021 7/12/21   Trials L R R   1  2  3 55 61 65   Average 55 61 65     Lat Pinch 5/17/2021 5/17/2021   Trials L R   1  2  3 16 10   Average 16 10     3 Pt Pinch 5/17/2021 5/17/2021   Trials L R   1  2  3 8 6   Average 8 6     , elbow extended 5/17/2021   Trials R   1  2  3 65   Average 65        Home Exercise Program:  Nerve glides, stretches  Strengthening - every other day / 3 times a week ok for strengthening (don't bother the nerves)  guasha at home to ext wad  KT to dorsal wreist to address ulnar distal nerve issues  KT to lateral elbow along radial nerve path / along ext muscles, also over extensor wad ({see flowsheet)    Next Visit:  Eccentric wrist strenthening vs isotonic  If helpful 7/19 -   of lateral elbow musculature / radial nerve path / where irritated  STM / MFR of ECRB

## 2021-08-06 ENCOUNTER — THERAPY VISIT (OUTPATIENT)
Dept: OCCUPATIONAL THERAPY | Facility: CLINIC | Age: 21
End: 2021-08-06
Payer: COMMERCIAL

## 2021-08-06 DIAGNOSIS — S52.124D CLOSED NONDISPLACED FRACTURE OF HEAD OF RIGHT RADIUS WITH ROUTINE HEALING, SUBSEQUENT ENCOUNTER: ICD-10-CM

## 2021-08-06 DIAGNOSIS — M25.521 RIGHT ELBOW PAIN: ICD-10-CM

## 2021-08-06 PROCEDURE — 97035 APP MDLTY 1+ULTRASOUND EA 15: CPT | Mod: GO | Performed by: OCCUPATIONAL THERAPIST

## 2021-08-06 PROCEDURE — 97112 NEUROMUSCULAR REEDUCATION: CPT | Mod: GO | Performed by: OCCUPATIONAL THERAPIST

## 2021-08-06 PROCEDURE — 97110 THERAPEUTIC EXERCISES: CPT | Mod: GO | Performed by: OCCUPATIONAL THERAPIST

## 2021-09-23 PROBLEM — S52.124D CLOSED NONDISPLACED FRACTURE OF HEAD OF RIGHT RADIUS WITH ROUTINE HEALING, SUBSEQUENT ENCOUNTER: Status: RESOLVED | Noted: 2021-05-17 | Resolved: 2021-09-23

## 2021-09-23 PROBLEM — M25.521 RIGHT ELBOW PAIN: Status: RESOLVED | Noted: 2021-07-19 | Resolved: 2021-09-23

## 2021-10-09 ENCOUNTER — HEALTH MAINTENANCE LETTER (OUTPATIENT)
Age: 21
End: 2021-10-09

## 2022-05-21 ENCOUNTER — HEALTH MAINTENANCE LETTER (OUTPATIENT)
Age: 22
End: 2022-05-21

## 2022-09-17 ENCOUNTER — HEALTH MAINTENANCE LETTER (OUTPATIENT)
Age: 22
End: 2022-09-17

## 2023-06-04 ENCOUNTER — HEALTH MAINTENANCE LETTER (OUTPATIENT)
Age: 23
End: 2023-06-04

## 2023-11-03 ENCOUNTER — LAB SERVICES (OUTPATIENT)
Dept: LAB | Age: 23
End: 2023-11-03

## 2023-11-03 ENCOUNTER — APPOINTMENT (OUTPATIENT)
Dept: FAMILY MEDICINE | Age: 23
End: 2023-11-03

## 2023-11-03 ENCOUNTER — OFFICE VISIT (OUTPATIENT)
Dept: FAMILY MEDICINE | Age: 23
End: 2023-11-03

## 2023-11-03 VITALS
WEIGHT: 180 LBS | RESPIRATION RATE: 16 BRPM | OXYGEN SATURATION: 97 % | HEART RATE: 70 BPM | SYSTOLIC BLOOD PRESSURE: 116 MMHG | DIASTOLIC BLOOD PRESSURE: 70 MMHG

## 2023-11-03 DIAGNOSIS — R41.840 INATTENTION: ICD-10-CM

## 2023-11-03 DIAGNOSIS — Z11.1 SCREENING FOR TUBERCULOSIS: ICD-10-CM

## 2023-11-03 DIAGNOSIS — Z00.00 ANNUAL PHYSICAL EXAM: Primary | ICD-10-CM

## 2023-11-03 DIAGNOSIS — K21.9 GASTROESOPHAGEAL REFLUX DISEASE WITHOUT ESOPHAGITIS: ICD-10-CM

## 2023-11-03 DIAGNOSIS — R94.31 ABNORMAL EKG: ICD-10-CM

## 2023-11-03 DIAGNOSIS — K76.9 LIVER PROBLEM: ICD-10-CM

## 2023-11-03 LAB
ALBUMIN SERPL-MCNC: 3.9 G/DL (ref 3.6–5.1)
ALBUMIN/GLOB SERPL: 1 {RATIO} (ref 1–2.4)
ALP SERPL-CCNC: 52 UNITS/L (ref 45–117)
ALT SERPL-CCNC: 25 UNITS/L
ANION GAP SERPL CALC-SCNC: 9 MMOL/L (ref 7–19)
AST SERPL-CCNC: 20 UNITS/L
BILIRUB SERPL-MCNC: 1.3 MG/DL (ref 0.2–1)
BUN SERPL-MCNC: 19 MG/DL (ref 6–20)
BUN/CREAT SERPL: 22 (ref 7–25)
CALCIUM SERPL-MCNC: 8.9 MG/DL (ref 8.4–10.2)
CHLORIDE SERPL-SCNC: 104 MMOL/L (ref 97–110)
CO2 SERPL-SCNC: 30 MMOL/L (ref 21–32)
CREAT SERPL-MCNC: 0.85 MG/DL (ref 0.51–0.95)
DEPRECATED RDW RBC: 38 FL (ref 39–50)
EGFRCR SERPLBLD CKD-EPI 2021: >90 ML/MIN/{1.73_M2}
ERYTHROCYTE [DISTWIDTH] IN BLOOD: 12 % (ref 11–15)
FASTING DURATION TIME PATIENT: ABNORMAL H
GLOBULIN SER-MCNC: 3.9 G/DL (ref 2–4)
GLUCOSE SERPL-MCNC: 81 MG/DL (ref 70–99)
HCT VFR BLD CALC: 36.6 % (ref 36–46.5)
HGB BLD-MCNC: 12.6 G/DL (ref 12–15.5)
MCH RBC QN AUTO: 29.9 PG (ref 26–34)
MCHC RBC AUTO-ENTMCNC: 34.4 G/DL (ref 32–36.5)
MCV RBC AUTO: 86.7 FL (ref 78–100)
NRBC BLD MANUAL-RTO: 0 /100 WBC
PLATELET # BLD AUTO: 200 K/MCL (ref 140–450)
POTASSIUM SERPL-SCNC: 4.1 MMOL/L (ref 3.4–5.1)
PROT SERPL-MCNC: 7.8 G/DL (ref 6.4–8.2)
RBC # BLD: 4.22 MIL/MCL (ref 4–5.2)
SODIUM SERPL-SCNC: 139 MMOL/L (ref 135–145)
WBC # BLD: 8.4 K/MCL (ref 4.2–11)

## 2023-11-03 PROCEDURE — 99385 PREV VISIT NEW AGE 18-39: CPT | Performed by: STUDENT IN AN ORGANIZED HEALTH CARE EDUCATION/TRAINING PROGRAM

## 2023-11-03 PROCEDURE — 99204 OFFICE O/P NEW MOD 45 MIN: CPT | Performed by: STUDENT IN AN ORGANIZED HEALTH CARE EDUCATION/TRAINING PROGRAM

## 2023-11-03 PROCEDURE — 85027 COMPLETE CBC AUTOMATED: CPT | Performed by: INTERNAL MEDICINE

## 2023-11-03 PROCEDURE — 86580 TB INTRADERMAL TEST: CPT | Performed by: STUDENT IN AN ORGANIZED HEALTH CARE EDUCATION/TRAINING PROGRAM

## 2023-11-03 PROCEDURE — 80053 COMPREHEN METABOLIC PANEL: CPT | Performed by: INTERNAL MEDICINE

## 2023-11-03 PROCEDURE — 82248 BILIRUBIN DIRECT: CPT | Performed by: INTERNAL MEDICINE

## 2023-11-03 PROCEDURE — 36415 COLL VENOUS BLD VENIPUNCTURE: CPT | Performed by: INTERNAL MEDICINE

## 2023-11-03 ASSESSMENT — PATIENT HEALTH QUESTIONNAIRE - PHQ9
CLINICAL INTERPRETATION OF PHQ2 SCORE: NO FURTHER SCREENING NEEDED
SUM OF ALL RESPONSES TO PHQ9 QUESTIONS 1 AND 2: 0
SUM OF ALL RESPONSES TO PHQ9 QUESTIONS 1 AND 2: 0
1. LITTLE INTEREST OR PLEASURE IN DOING THINGS: NOT AT ALL
2. FEELING DOWN, DEPRESSED OR HOPELESS: NOT AT ALL

## 2023-11-06 ENCOUNTER — TELEPHONE (OUTPATIENT)
Dept: FAMILY MEDICINE | Age: 23
End: 2023-11-06

## 2023-11-06 ENCOUNTER — APPOINTMENT (OUTPATIENT)
Dept: FAMILY MEDICINE | Age: 23
End: 2023-11-06

## 2023-11-06 DIAGNOSIS — E80.4 GILBERT'S SYNDROME: Primary | ICD-10-CM

## 2023-11-06 LAB
BILIRUB CONJ SERPL-MCNC: 0.2 MG/DL (ref 0–0.2)
INDURATION: 0 MM (ref 0–?)
SKIN TEST RESULT: NEGATIVE

## 2023-12-12 PROBLEM — K76.9 LIVER PROBLEM: Status: RESOLVED | Noted: 2023-11-03 | Resolved: 2023-12-12

## 2023-12-13 ENCOUNTER — OFFICE VISIT (OUTPATIENT)
Dept: FAMILY MEDICINE | Age: 23
End: 2023-12-13

## 2023-12-13 VITALS
RESPIRATION RATE: 14 BRPM | BODY MASS INDEX: 29.67 KG/M2 | HEIGHT: 66 IN | WEIGHT: 184.6 LBS | HEART RATE: 67 BPM | DIASTOLIC BLOOD PRESSURE: 60 MMHG | OXYGEN SATURATION: 98 % | SYSTOLIC BLOOD PRESSURE: 98 MMHG

## 2023-12-13 DIAGNOSIS — K21.9 GASTROESOPHAGEAL REFLUX DISEASE WITHOUT ESOPHAGITIS: ICD-10-CM

## 2023-12-13 DIAGNOSIS — R05.1 ACUTE COUGH: Primary | ICD-10-CM

## 2023-12-13 PROCEDURE — 99214 OFFICE O/P EST MOD 30 MIN: CPT | Performed by: STUDENT IN AN ORGANIZED HEALTH CARE EDUCATION/TRAINING PROGRAM

## 2023-12-13 RX ORDER — GUAIFENESIN 600 MG/1
1200 TABLET, EXTENDED RELEASE ORAL 2 TIMES DAILY
Qty: 40 TABLET | Refills: 0 | Status: SHIPPED | OUTPATIENT
Start: 2023-12-13 | End: 2023-12-23

## 2023-12-13 RX ORDER — OMEPRAZOLE 20 MG/1
20 CAPSULE, DELAYED RELEASE ORAL DAILY
Qty: 14 CAPSULE | Refills: 0 | Status: SHIPPED | OUTPATIENT
Start: 2023-12-13 | End: 2023-12-27

## 2023-12-13 RX ORDER — BENZONATATE 100 MG/1
100 CAPSULE ORAL 3 TIMES DAILY PRN
Qty: 30 CAPSULE | Refills: 0 | Status: SHIPPED | OUTPATIENT
Start: 2023-12-13

## 2023-12-22 ENCOUNTER — HOSPITAL ENCOUNTER (EMERGENCY)
Age: 23
Discharge: HOME | End: 2023-12-22
Attending: EMERGENCY MEDICINE

## 2023-12-22 ENCOUNTER — APPOINTMENT (OUTPATIENT)
Dept: GENERAL RADIOLOGY | Age: 23
End: 2023-12-22
Attending: EMERGENCY MEDICINE

## 2023-12-22 VITALS
HEART RATE: 109 BPM | WEIGHT: 182.9 LBS | BODY MASS INDEX: 29.52 KG/M2 | TEMPERATURE: 97.5 F | SYSTOLIC BLOOD PRESSURE: 114 MMHG | RESPIRATION RATE: 16 BRPM | OXYGEN SATURATION: 99 % | DIASTOLIC BLOOD PRESSURE: 68 MMHG

## 2023-12-22 DIAGNOSIS — R10.13 EPIGASTRIC PAIN: ICD-10-CM

## 2023-12-22 DIAGNOSIS — R11.2 NAUSEA AND VOMITING IN ADULT: Primary | ICD-10-CM

## 2023-12-22 DIAGNOSIS — R05.2 SUBACUTE COUGH: ICD-10-CM

## 2023-12-22 LAB
ALBUMIN SERPL-MCNC: 3.8 G/DL (ref 3.6–5.1)
ALBUMIN/GLOB SERPL: 1 {RATIO} (ref 1–2.4)
ALP SERPL-CCNC: 56 UNITS/L (ref 45–117)
ALT SERPL-CCNC: 35 UNITS/L
ANION GAP SERPL CALC-SCNC: 13 MMOL/L (ref 7–19)
APPEARANCE UR: CLEAR
AST SERPL-CCNC: 12 UNITS/L
B-HCG UR QL: NEGATIVE
BACTERIA #/AREA URNS HPF: ABNORMAL /HPF
BASOPHILS # BLD: 0 K/MCL (ref 0–0.3)
BASOPHILS NFR BLD: 0 %
BILIRUB SERPL-MCNC: 2.2 MG/DL (ref 0.2–1)
BILIRUB UR QL STRIP: NEGATIVE
BUN SERPL-MCNC: 17 MG/DL (ref 6–20)
BUN/CREAT SERPL: 22 (ref 7–25)
CALCIUM SERPL-MCNC: 8.6 MG/DL (ref 8.4–10.2)
CHLORIDE SERPL-SCNC: 102 MMOL/L (ref 97–110)
CO2 SERPL-SCNC: 29 MMOL/L (ref 21–32)
COLOR UR: YELLOW
CREAT SERPL-MCNC: 0.77 MG/DL (ref 0.51–0.95)
DEPRECATED RDW RBC: 38.3 FL (ref 39–50)
EGFRCR SERPLBLD CKD-EPI 2021: >90 ML/MIN/{1.73_M2}
EOSINOPHIL # BLD: 0 K/MCL (ref 0–0.5)
EOSINOPHIL NFR BLD: 0 %
ERYTHROCYTE [DISTWIDTH] IN BLOOD: 12.4 % (ref 11–15)
FASTING DURATION TIME PATIENT: ABNORMAL H
FLUAV RNA RESP QL NAA+PROBE: NOT DETECTED
FLUBV RNA RESP QL NAA+PROBE: NOT DETECTED
GLOBULIN SER-MCNC: 4 G/DL (ref 2–4)
GLUCOSE SERPL-MCNC: 106 MG/DL (ref 70–99)
GLUCOSE UR STRIP-MCNC: NEGATIVE MG/DL
HCT VFR BLD CALC: 38.2 % (ref 36–46.5)
HGB BLD-MCNC: 13.2 G/DL (ref 12–15.5)
HGB UR QL STRIP: ABNORMAL
HYALINE CASTS #/AREA URNS LPF: ABNORMAL /LPF
IMM GRANULOCYTES # BLD AUTO: 0 K/MCL (ref 0–0.2)
IMM GRANULOCYTES # BLD: 0 %
KETONES UR STRIP-MCNC: 15 MG/DL
LEUKOCYTE ESTERASE UR QL STRIP: NEGATIVE
LIPASE SERPL-CCNC: 18 UNITS/L (ref 15–77)
LYMPHOCYTES # BLD: 0.5 K/MCL (ref 1–4.8)
LYMPHOCYTES NFR BLD: 5 %
MCH RBC QN AUTO: 29.7 PG (ref 26–34)
MCHC RBC AUTO-ENTMCNC: 34.6 G/DL (ref 32–36.5)
MCV RBC AUTO: 85.8 FL (ref 78–100)
MONOCYTES # BLD: 0.3 K/MCL (ref 0.3–0.9)
MONOCYTES NFR BLD: 3 %
MUCOUS THREADS URNS QL MICRO: PRESENT
NEUTROPHILS # BLD: 9 K/MCL (ref 1.8–7.7)
NEUTROPHILS NFR BLD: 92 %
NITRITE UR QL STRIP: NEGATIVE
NRBC BLD MANUAL-RTO: 0 /100 WBC
PH UR STRIP: 5.5 [PH] (ref 5–7)
PLATELET # BLD AUTO: 189 K/MCL (ref 140–450)
POTASSIUM SERPL-SCNC: 3.7 MMOL/L (ref 3.4–5.1)
PROT SERPL-MCNC: 7.8 G/DL (ref 6.4–8.2)
PROT UR STRIP-MCNC: ABNORMAL MG/DL
RBC # BLD: 4.45 MIL/MCL (ref 4–5.2)
RBC #/AREA URNS HPF: ABNORMAL /HPF
RSV AG NPH QL IA.RAPID: NOT DETECTED
SARS-COV-2 RNA RESP QL NAA+PROBE: NOT DETECTED
SERVICE CMNT-IMP: NORMAL
SERVICE CMNT-IMP: NORMAL
SODIUM SERPL-SCNC: 140 MMOL/L (ref 135–145)
SP GR UR STRIP: >1.03 (ref 1–1.03)
SQUAMOUS #/AREA URNS HPF: ABNORMAL /HPF
UROBILINOGEN UR STRIP-MCNC: 0.2 MG/DL
WBC # BLD: 9.8 K/MCL (ref 4.2–11)
WBC #/AREA URNS HPF: ABNORMAL /HPF

## 2023-12-22 PROCEDURE — 80053 COMPREHEN METABOLIC PANEL: CPT | Performed by: EMERGENCY MEDICINE

## 2023-12-22 PROCEDURE — 71046 X-RAY EXAM CHEST 2 VIEWS: CPT

## 2023-12-22 PROCEDURE — 85025 COMPLETE CBC W/AUTO DIFF WBC: CPT | Performed by: EMERGENCY MEDICINE

## 2023-12-22 PROCEDURE — 81001 URINALYSIS AUTO W/SCOPE: CPT | Performed by: EMERGENCY MEDICINE

## 2023-12-22 PROCEDURE — 81025 URINE PREGNANCY TEST: CPT | Performed by: EMERGENCY MEDICINE

## 2023-12-22 PROCEDURE — 83690 ASSAY OF LIPASE: CPT | Performed by: EMERGENCY MEDICINE

## 2023-12-22 PROCEDURE — 36415 COLL VENOUS BLD VENIPUNCTURE: CPT

## 2023-12-22 PROCEDURE — 10002807 HB RX 258: Performed by: EMERGENCY MEDICINE

## 2023-12-22 PROCEDURE — 99284 EMERGENCY DEPT VISIT MOD MDM: CPT

## 2023-12-22 PROCEDURE — 10002800 HB RX 250 W HCPCS: Performed by: EMERGENCY MEDICINE

## 2023-12-22 PROCEDURE — 71046 X-RAY EXAM CHEST 2 VIEWS: CPT | Performed by: RADIOLOGY

## 2023-12-22 PROCEDURE — 94640 AIRWAY INHALATION TREATMENT: CPT

## 2023-12-22 PROCEDURE — 10002801 HB RX 250 W/O HCPCS: Performed by: EMERGENCY MEDICINE

## 2023-12-22 PROCEDURE — 0241U COVID/FLU/RSV PANEL: CPT | Performed by: EMERGENCY MEDICINE

## 2023-12-22 PROCEDURE — 96374 THER/PROPH/DIAG INJ IV PUSH: CPT

## 2023-12-22 PROCEDURE — 99284 EMERGENCY DEPT VISIT MOD MDM: CPT | Performed by: EMERGENCY MEDICINE

## 2023-12-22 PROCEDURE — 96361 HYDRATE IV INFUSION ADD-ON: CPT

## 2023-12-22 RX ORDER — ALBUTEROL SULFATE 2.5 MG/3ML
2.5 SOLUTION RESPIRATORY (INHALATION) ONCE
Status: COMPLETED | OUTPATIENT
Start: 2023-12-22 | End: 2023-12-22

## 2023-12-22 RX ORDER — ONDANSETRON 4 MG/1
4 TABLET, ORALLY DISINTEGRATING ORAL ONCE
Status: DISCONTINUED | OUTPATIENT
Start: 2023-12-22 | End: 2023-12-22 | Stop reason: SDUPTHER

## 2023-12-22 RX ORDER — ONDANSETRON 2 MG/ML
4 INJECTION INTRAMUSCULAR; INTRAVENOUS ONCE
Status: COMPLETED | OUTPATIENT
Start: 2023-12-22 | End: 2023-12-22

## 2023-12-22 RX ORDER — ONDANSETRON 4 MG/1
4 TABLET, ORALLY DISINTEGRATING ORAL EVERY 8 HOURS PRN
Qty: 10 TABLET | Refills: 0 | Status: SHIPPED | OUTPATIENT
Start: 2023-12-22 | End: 2023-12-22

## 2023-12-22 RX ORDER — ONDANSETRON 4 MG/1
4 TABLET, ORALLY DISINTEGRATING ORAL EVERY 8 HOURS PRN
Qty: 10 TABLET | Refills: 0 | Status: SHIPPED | OUTPATIENT
Start: 2023-12-22

## 2023-12-22 RX ADMIN — SODIUM CHLORIDE, POTASSIUM CHLORIDE, SODIUM LACTATE AND CALCIUM CHLORIDE 1000 ML: 600; 310; 30; 20 INJECTION, SOLUTION INTRAVENOUS at 21:20

## 2023-12-22 RX ADMIN — ALBUTEROL SULFATE 2.5 MG: 2.5 SOLUTION RESPIRATORY (INHALATION) at 18:50

## 2023-12-22 RX ADMIN — SODIUM CHLORIDE 1000 ML: 9 INJECTION, SOLUTION INTRAVENOUS at 19:50

## 2023-12-22 RX ADMIN — ONDANSETRON 4 MG: 2 INJECTION INTRAMUSCULAR; INTRAVENOUS at 19:51

## 2023-12-22 ASSESSMENT — PAIN SCALES - GENERAL: PAINLEVEL_OUTOF10: 6

## 2023-12-22 ASSESSMENT — ENCOUNTER SYMPTOMS
COUGH: 1
FEVER: 0
ABDOMINAL PAIN: 1
VOMITING: 1

## 2023-12-23 LAB
RAINBOW EXTRA TUBES HOLD SPECIMEN: NORMAL
RAINBOW EXTRA TUBES HOLD SPECIMEN: NORMAL

## 2024-01-31 ENCOUNTER — V-VISIT (OUTPATIENT)
Dept: FAMILY MEDICINE | Age: 24
End: 2024-01-31

## 2024-01-31 DIAGNOSIS — H10.31 ACUTE BACTERIAL CONJUNCTIVITIS OF RIGHT EYE: Primary | ICD-10-CM

## 2024-01-31 DIAGNOSIS — Z02.89 ENCOUNTER TO OBTAIN EXCUSE FROM WORK: ICD-10-CM

## 2024-01-31 PROCEDURE — 99213 OFFICE O/P EST LOW 20 MIN: CPT | Performed by: NURSE PRACTITIONER

## 2024-01-31 RX ORDER — TOBRAMYCIN 3 MG/ML
1 SOLUTION/ DROPS OPHTHALMIC EVERY 4 HOURS
Qty: 5 ML | Refills: 0 | Status: SHIPPED | OUTPATIENT
Start: 2024-01-31 | End: 2024-02-07

## 2024-07-13 ENCOUNTER — HEALTH MAINTENANCE LETTER (OUTPATIENT)
Age: 24
End: 2024-07-13

## 2024-12-16 SDOH — ECONOMIC STABILITY: TRANSPORTATION INSECURITY
IN THE PAST 12 MONTHS, HAS LACK OF RELIABLE TRANSPORTATION KEPT YOU FROM MEDICAL APPOINTMENTS, MEETINGS, WORK OR FROM GETTING THINGS NEEDED FOR DAILY LIVING?: NO

## 2024-12-16 SDOH — ECONOMIC STABILITY: FOOD INSECURITY: WITHIN THE PAST 12 MONTHS, THE FOOD YOU BOUGHT JUST DIDN'T LAST AND YOU DIDN'T HAVE MONEY TO GET MORE.: NEVER TRUE

## 2024-12-16 SDOH — ECONOMIC STABILITY: HOUSING INSECURITY: WHAT IS YOUR LIVING SITUATION TODAY?: I HAVE A STEADY PLACE TO LIVE

## 2024-12-16 SDOH — ECONOMIC STABILITY: HOUSING INSECURITY: DO YOU HAVE PROBLEMS WITH ANY OF THE FOLLOWING?: NONE OF THE ABOVE

## 2024-12-16 SDOH — ECONOMIC STABILITY: GENERAL: WOULD YOU LIKE HELP WITH ANY OF THE FOLLOWING NEEDS?: I DON'T WANT HELP WITH ANY OF THESE

## 2024-12-16 ASSESSMENT — SOCIAL DETERMINANTS OF HEALTH (SDOH): IN THE PAST 12 MONTHS, HAS THE ELECTRIC, GAS, OIL, OR WATER COMPANY THREATENED TO SHUT OFF SERVICE IN YOUR HOME?: NO

## 2024-12-23 ENCOUNTER — APPOINTMENT (OUTPATIENT)
Dept: OBGYN | Age: 24
End: 2024-12-23

## 2024-12-23 VITALS
BODY MASS INDEX: 27 KG/M2 | HEIGHT: 67 IN | HEART RATE: 78 BPM | WEIGHT: 172 LBS | SYSTOLIC BLOOD PRESSURE: 90 MMHG | DIASTOLIC BLOOD PRESSURE: 60 MMHG

## 2024-12-23 DIAGNOSIS — Z12.4 CERVICAL CANCER SCREENING: ICD-10-CM

## 2024-12-23 DIAGNOSIS — Z01.419 WELL WOMAN EXAM WITH ROUTINE GYNECOLOGICAL EXAM: Primary | ICD-10-CM

## 2024-12-23 PROCEDURE — 99385 PREV VISIT NEW AGE 18-39: CPT | Performed by: NURSE PRACTITIONER

## 2024-12-25 LAB — HOLD SPECIMEN: NORMAL

## 2024-12-31 LAB
CASE RPRT: NORMAL
CYTOLOGY CVX/VAG STUDY: NORMAL
PAP EDUCATIONAL NOTE: NORMAL
SPECIMEN ADEQUACY: NORMAL

## 2025-08-26 DIAGNOSIS — M25.562 LEFT KNEE PAIN: ICD-10-CM

## 2025-08-26 DIAGNOSIS — M25.561 BILATERAL KNEE PAIN: Primary | ICD-10-CM

## 2025-08-26 DIAGNOSIS — M25.562 BILATERAL KNEE PAIN: Primary | ICD-10-CM

## 2025-08-27 ENCOUNTER — OFFICE VISIT (OUTPATIENT)
Dept: REHABILITATION | Age: 25
End: 2025-08-27

## 2025-08-27 DIAGNOSIS — M25.562 ACUTE PAIN OF BOTH KNEES: Primary | ICD-10-CM

## 2025-08-27 DIAGNOSIS — M25.561 ACUTE PAIN OF BOTH KNEES: Primary | ICD-10-CM

## 2025-08-27 PROCEDURE — 97530 THERAPEUTIC ACTIVITIES: CPT | Performed by: BEHAVIOR TECHNICIAN

## 2025-08-27 PROCEDURE — 97140 MANUAL THERAPY 1/> REGIONS: CPT | Performed by: BEHAVIOR TECHNICIAN

## 2025-08-27 PROCEDURE — 97161 PT EVAL LOW COMPLEX 20 MIN: CPT | Performed by: BEHAVIOR TECHNICIAN

## 2025-08-27 ASSESSMENT — MOVEMENT AND STRENGTH ASSESSMENTS
PERFORMING HEAVY ACTIVITIES AROUND YOUR HOME: NO DIFFICULTY
WALKING A MILE: A LITTLE BIT OF DIFFICULTY
WALKING BETWEEN ROOMS: NO DIFFICULTY
TOTAL SCORE: 76.25
ROLLING OVER IN BED: NO DIFFICULTY
HOPPING: A LITTLE BIT OF DIFFICULTY
YOUR USUAL HOBBIES, RECREATIONAL OR SPORTING ACTIVIITIES: A LITTLE BIT OF DIFFICULTY
GOING UP OR DOWN 10 STAIRS (ABOUT 1 FLIGHT OF STAIRS): A LITTLE BIT OF DIFFICULTY
GETTING INTO OR OUT OF THE BATH: NO DIFFICULTY
WALKING 2 BLOCKS: NO DIFFICULTY
MAKING SHARP TURNS WHILE RUNNING FAST: EXTREME DIFFICULTY OR UNABLE TO PERFORM ACTIVITY
SITTING FOR 1 HOUR: NO DIFFICULTY
LIFTING AN OBJECT, LIKE A BAG OF GROCERIES, FROM THE FLOOR: A LITTLE BIT OF DIFFICULTY
STANDING FOR 1 HOUR: A LITTLE BIT OF DIFFICULTY
RUNNING ON UNEVEN GROUND: QUITE A BIT OF DIFFICULTY
PUTTING ON YOUR SHOES OR SOCKS: A LITTLE BIT OF DIFFICULTY
PERFORMING LIGHT ACTIVITES AROUND YOUR HOME: NO DIFFICULTY
SQUATTING: MODERATE DIFFICULTY
GETTING INTO OR OUT OF A CAR: NO DIFFICULTY
RUNNING ON EVEN GROUND: MODERATE DIFFICULTY
ANY OF YOUR USUAL WORK, HOUSEWORK OR SCHOOL ACTIVITIES: A LITTLE BIT OF DIFFICULTY

## 2025-08-27 ASSESSMENT — ENCOUNTER SYMPTOMS
SUBJECTIVE PAIN PROGRESSION: IMPROVED
PAIN SEVERITY NOW: 3
PAIN SCALE AT HIGHEST: 5
PAIN FREQUENCY: CONSTANT
QUALITY: SHARP
ALLEVIATING FACTORS: AVOIDING MOVEMENT IN INVOLVED AREA
PAIN SCALE AT LOWEST: 0
ALLEVIATING FACTORS: REST